# Patient Record
Sex: MALE
[De-identification: names, ages, dates, MRNs, and addresses within clinical notes are randomized per-mention and may not be internally consistent; named-entity substitution may affect disease eponyms.]

---

## 2024-05-16 ENCOUNTER — DASHBOARD ENCOUNTERS (OUTPATIENT)
Age: 44
End: 2024-05-16

## 2024-05-16 ENCOUNTER — OFFICE VISIT (OUTPATIENT)
Dept: URBAN - METROPOLITAN AREA CLINIC 19 | Facility: CLINIC | Age: 44
End: 2024-05-16
Payer: COMMERCIAL

## 2024-05-16 ENCOUNTER — OFFICE VISIT (OUTPATIENT)
Dept: URBAN - METROPOLITAN AREA CLINIC 19 | Facility: CLINIC | Age: 44
End: 2024-05-16

## 2024-05-16 ENCOUNTER — LAB OUTSIDE AN ENCOUNTER (OUTPATIENT)
Dept: URBAN - METROPOLITAN AREA CLINIC 19 | Facility: CLINIC | Age: 44
End: 2024-05-16

## 2024-05-16 VITALS
OXYGEN SATURATION: 97 % | TEMPERATURE: 98.4 F | SYSTOLIC BLOOD PRESSURE: 142 MMHG | WEIGHT: 212 LBS | HEART RATE: 73 BPM | HEIGHT: 71 IN | BODY MASS INDEX: 29.68 KG/M2 | DIASTOLIC BLOOD PRESSURE: 78 MMHG

## 2024-05-16 DIAGNOSIS — K92.1 RECTAL BLEEDING: ICD-10-CM

## 2024-05-16 DIAGNOSIS — Z12.11 SCREEN FOR COLON CANCER: ICD-10-CM

## 2024-05-16 DIAGNOSIS — R13.10 ESOPHAGEAL DYSPHAGIA: ICD-10-CM

## 2024-05-16 PROCEDURE — 99204 OFFICE O/P NEW MOD 45 MIN: CPT | Performed by: NURSE PRACTITIONER

## 2024-06-06 ENCOUNTER — OUT OF OFFICE VISIT (OUTPATIENT)
Dept: URBAN - METROPOLITAN AREA SURGERY CENTER 31 | Facility: SURGERY CENTER | Age: 44
End: 2024-06-06
Payer: COMMERCIAL

## 2024-06-06 ENCOUNTER — CLAIMS CREATED FROM THE CLAIM WINDOW (OUTPATIENT)
Dept: URBAN - METROPOLITAN AREA CLINIC 4 | Facility: CLINIC | Age: 44
End: 2024-06-06
Payer: COMMERCIAL

## 2024-06-06 DIAGNOSIS — K31.89 OTHER DISEASES OF STOMACH AND DUODENUM: ICD-10-CM

## 2024-06-06 DIAGNOSIS — K22.89 OTHER SPECIFIED DISEASE OF ESOPHAGUS: ICD-10-CM

## 2024-06-06 DIAGNOSIS — K20.0 EOSINOPHILIC ESOPHAGITIS: ICD-10-CM

## 2024-06-06 DIAGNOSIS — K63.5 POLYP OF COLON: ICD-10-CM

## 2024-06-06 DIAGNOSIS — K22.2 ACQUIRED ESOPHAGEAL RING: ICD-10-CM

## 2024-06-06 DIAGNOSIS — K63.5 BENIGN COLON POLYP: ICD-10-CM

## 2024-06-06 DIAGNOSIS — Z12.11 COLON CANCER SCREENING: ICD-10-CM

## 2024-06-06 DIAGNOSIS — K63.5 BENIGN COLON POLYPS: ICD-10-CM

## 2024-06-06 PROCEDURE — 43249 ESOPH EGD DILATION <30 MM: CPT | Performed by: INTERNAL MEDICINE

## 2024-06-06 PROCEDURE — 45380 COLONOSCOPY AND BIOPSY: CPT | Performed by: INTERNAL MEDICINE

## 2024-06-06 PROCEDURE — 88312 SPECIAL STAINS GROUP 1: CPT | Performed by: PATHOLOGY

## 2024-06-06 PROCEDURE — 88305 TISSUE EXAM BY PATHOLOGIST: CPT | Performed by: PATHOLOGY

## 2024-06-06 PROCEDURE — G8907 PT DOC NO EVENTS ON DISCHARG: HCPCS | Performed by: INTERNAL MEDICINE

## 2024-06-06 PROCEDURE — 00813 ANES UPR LWR GI NDSC PX: CPT | Performed by: NURSE ANESTHETIST, CERTIFIED REGISTERED

## 2024-06-06 PROCEDURE — 43239 EGD BIOPSY SINGLE/MULTIPLE: CPT | Performed by: INTERNAL MEDICINE

## 2024-06-20 ENCOUNTER — TELEPHONE ENCOUNTER (OUTPATIENT)
Dept: URBAN - METROPOLITAN AREA CLINIC 19 | Facility: CLINIC | Age: 44
End: 2024-06-20

## 2024-07-08 ENCOUNTER — OFFICE VISIT (OUTPATIENT)
Dept: URBAN - METROPOLITAN AREA CLINIC 19 | Facility: CLINIC | Age: 44
End: 2024-07-08
Payer: COMMERCIAL

## 2024-07-08 VITALS
DIASTOLIC BLOOD PRESSURE: 80 MMHG | HEIGHT: 71 IN | SYSTOLIC BLOOD PRESSURE: 132 MMHG | HEART RATE: 60 BPM | BODY MASS INDEX: 28.87 KG/M2 | TEMPERATURE: 98 F | WEIGHT: 206.2 LBS

## 2024-07-08 DIAGNOSIS — K20.0 EOSINOPHILIC ESOPHAGITIS: ICD-10-CM

## 2024-07-08 PROCEDURE — 99213 OFFICE O/P EST LOW 20 MIN: CPT | Performed by: NURSE PRACTITIONER

## 2024-07-08 RX ORDER — PANTOPRAZOLE SODIUM 40 MG/1
1 TABLET TABLET, DELAYED RELEASE ORAL TWICE DAILY
Qty: 120 | Refills: 0 | OUTPATIENT
Start: 2024-07-08

## 2024-07-08 NOTE — HPI-TODAY'S VISIT:
Mr. Fsiher is a 45 yo male presents today for follow-up.  Last seen in GI clinic 5/16/2024 with c/o trouble swallowing, and rectal bleeding.  He reported dysphagia ongoing a long time. Usually occurring with meat getting stuck, had to drink a lot and jump up and down or occasionally regurgitate it back up. No issue with liquids or pills. Used to happen several times a week when he was chewing tobacco but this got better when he stopped.  No family hx of colon cancer.  He is a  so concerned about his exposure risk.  EGD/colonoscopy was performed by Dr. Andujar on 6/6/2024 EGD-low-grade of narrowing Schatzki ring-dilated.  [place]-colored mucosa.  Erythematous mucosa in the antrum.  Normal duodenum.  He was placed on pantoprazole 40 mg/day. Stomach antrum-negative.  Lower third biopsy with basal cell hyperplasia and numerous intraepithelial eosinophils (70/hpf) consistent with EOE.  Middle third biopsy-eosinophils (30/HPF) consistent with EOE; negative for dysplasia negative for Santos's Colonoscopy-4 mm (benign) polyp in the descending colon.  Internal hemorrhoids.  Diverticulosis in the ascending colon.  5-year follow-up given.

## 2024-07-08 NOTE — PHYSICAL EXAM GASTROINTESTINAL
Abdomen , soft, nontender, nondistended , no guarding or rigidity , Liver and Spleen,  no hepatosplenomegaly

## 2025-01-08 ENCOUNTER — OFFICE VISIT (OUTPATIENT)
Dept: URBAN - METROPOLITAN AREA CLINIC 19 | Facility: CLINIC | Age: 45
End: 2025-01-08
Payer: COMMERCIAL

## 2025-01-08 VITALS
BODY MASS INDEX: 30.52 KG/M2 | OXYGEN SATURATION: 98 % | DIASTOLIC BLOOD PRESSURE: 80 MMHG | TEMPERATURE: 99.1 F | HEART RATE: 69 BPM | WEIGHT: 218 LBS | SYSTOLIC BLOOD PRESSURE: 120 MMHG | HEIGHT: 71 IN

## 2025-01-08 DIAGNOSIS — K20.0 EOSINOPHILIC ESOPHAGITIS: ICD-10-CM

## 2025-01-08 DIAGNOSIS — K21.9 GERD: ICD-10-CM

## 2025-01-08 PROCEDURE — 99213 OFFICE O/P EST LOW 20 MIN: CPT | Performed by: NURSE PRACTITIONER

## 2025-01-08 RX ORDER — PANTOPRAZOLE SODIUM 40 MG/1
1 TABLET TABLET, DELAYED RELEASE ORAL TWICE A DAY
Qty: 180 TABLET | Refills: 0 | OUTPATIENT
Start: 2025-01-08

## 2025-01-08 RX ORDER — PANTOPRAZOLE SODIUM 40 MG/1
1 TABLET TABLET, DELAYED RELEASE ORAL TWICE DAILY
Qty: 120 | Refills: 0 | Status: DISCONTINUED | COMMUNITY
Start: 2024-07-08

## 2025-07-25 ENCOUNTER — OFFICE VISIT (OUTPATIENT)
Dept: URBAN - METROPOLITAN AREA CLINIC 19 | Facility: CLINIC | Age: 45
End: 2025-07-25

## 2025-07-25 RX ORDER — PANTOPRAZOLE SODIUM 40 MG/1
1 TABLET TABLET, DELAYED RELEASE ORAL TWICE A DAY
Qty: 180 TABLET | Refills: 0 | Status: ACTIVE | COMMUNITY
Start: 2025-01-08

## 2025-07-25 NOTE — HPI-TODAY'S VISIT:
Mr. Fisher is a 44 yo male with PMH of reported Alpha-gal syndrome (AGS) presents today for follow-up. Last seen in GI clinic 1/8/2025 At the time recommended pantoprazole 40 mg twice daily x 8 weeks then dropped to daily dosing         Prior history- - - - - - - Seen in GI clinic 5/16/2024 with c/o trouble swallowing, and rectal bleeding. He reported dysphagia ongoing a long time. Usually occurring with meat getting stuck, had to drink a lot and jump up and down or occasionally regurgitate it back up. No issue with liquids or pills. Used to happen several times a week when he was chewing tobacco but this got better when he stopped.  - No family hx of colon cancer. - He is a  so concerned about his exposure risk. - EGD/colonoscopy was performed by Dr. Andujar on 6/6/2024 - EGD-low-grade of narrowing Schatzki ring-dilated. [place]-colored mucosa. Erythematous mucosa in the antrum. Normal duodenum. He was placed on pantoprazole 40 mg/day. Stomach antrum-negative. Lower third biopsy with basal cell hyperplasia and numerous intraepithelial eosinophils (70/hpf) consistent with EOE. Middle third biopsy-eosinophils (30/HPF) consistent with EOE; negative for dysplasia negative for Santos's Colonoscopy-4 mm (benign) polyp in the descending colon. Internal hemorrhoids. Diverticulosis in the ascending colon. 5-year follow-up given.